# Patient Record
Sex: FEMALE | Race: WHITE | Employment: UNEMPLOYED | URBAN - METROPOLITAN AREA
[De-identification: names, ages, dates, MRNs, and addresses within clinical notes are randomized per-mention and may not be internally consistent; named-entity substitution may affect disease eponyms.]

---
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
## 2023-05-17 NOTE — ED NOTES
03/21/18 1706   Child Life   Location ED  (CC: Suicidal)   Intervention Preparation;Procedure Support;Family Support   Preparation Comment Introduced self and CFL services.  Reviewed blood draw with pt.  Pt stated pt was familiar with getting blood drawn but does not like the feeling of the poke.  Introduced the j-tip to pt, which was utilized today.  Pt stated that the j-tip minimized the pain.  This CCLS engaged in conversation with pt throughout blood draw.  Verbalized preparation of admission process to pt.  No questions or concerns stated at this time.   Family Support Comment Pt's mother arrived later in the evening.   Anxiety Appropriate   Major Change/Loss/Stressor hospitalization   Fears/Concerns needles   Special Interests Art/painting      Calcipotriene Counseling:  I discussed with the patient the risks of calcipotriene including but not limited to erythema, scaling, itching, and irritation.